# Patient Record
Sex: MALE | Race: WHITE | NOT HISPANIC OR LATINO
[De-identification: names, ages, dates, MRNs, and addresses within clinical notes are randomized per-mention and may not be internally consistent; named-entity substitution may affect disease eponyms.]

---

## 2020-05-02 ENCOUNTER — TRANSCRIPTION ENCOUNTER (OUTPATIENT)
Age: 65
End: 2020-05-02

## 2020-08-03 ENCOUNTER — TRANSCRIPTION ENCOUNTER (OUTPATIENT)
Age: 65
End: 2020-08-03

## 2020-09-09 ENCOUNTER — TRANSCRIPTION ENCOUNTER (OUTPATIENT)
Age: 65
End: 2020-09-09

## 2021-07-15 ENCOUNTER — NON-APPOINTMENT (OUTPATIENT)
Age: 66
End: 2021-07-15

## 2021-07-15 ENCOUNTER — APPOINTMENT (OUTPATIENT)
Dept: PULMONOLOGY | Facility: CLINIC | Age: 66
End: 2021-07-15
Payer: COMMERCIAL

## 2021-07-15 VITALS
WEIGHT: 185 LBS | OXYGEN SATURATION: 97 % | HEART RATE: 75 BPM | RESPIRATION RATE: 14 BRPM | SYSTOLIC BLOOD PRESSURE: 140 MMHG | HEIGHT: 64 IN | BODY MASS INDEX: 31.58 KG/M2 | DIASTOLIC BLOOD PRESSURE: 84 MMHG

## 2021-07-15 DIAGNOSIS — G47.33 OBSTRUCTIVE SLEEP APNEA (ADULT) (PEDIATRIC): ICD-10-CM

## 2021-07-15 PROBLEM — Z00.00 ENCOUNTER FOR PREVENTIVE HEALTH EXAMINATION: Status: ACTIVE | Noted: 2021-07-15

## 2021-07-15 PROCEDURE — 99072 ADDL SUPL MATRL&STAF TM PHE: CPT

## 2021-07-15 PROCEDURE — 99212 OFFICE O/P EST SF 10 MIN: CPT

## 2021-07-15 NOTE — ASSESSMENT
[FreeTextEntry1] : A:\par IKE\par \par \par PLAN:\par The patient is benefitting from the CPAP.\par New supplies\par Weight loss\par Stress the need maintain compliance  with the CPAP.\par The patient is not to use an Ozone or UV sterilizer \par F/U in 6 months\par \par Does not have [facility] unit.

## 2021-07-15 NOTE — HISTORY OF PRESENT ILLNESS
[Follow-Up - Routine Clinic] : a routine clinic follow-up of [None] : No associated symptoms are reported [CPAP] : CPAP [Good Compliance] : good compliance with treatment [Good Tolerance] : good tolerance of treatment [Good Symptom Control] : good symptom control [TextBox_4] : I reviewed all the previous sleep test that are available on file.\par I reviewed my previous office notes.\par

## 2022-07-14 ENCOUNTER — APPOINTMENT (OUTPATIENT)
Age: 67
End: 2022-07-14